# Patient Record
Sex: MALE | Race: WHITE | NOT HISPANIC OR LATINO | Employment: FULL TIME | URBAN - METROPOLITAN AREA
[De-identification: names, ages, dates, MRNs, and addresses within clinical notes are randomized per-mention and may not be internally consistent; named-entity substitution may affect disease eponyms.]

---

## 2017-06-22 ENCOUNTER — RECORDS - HEALTHEAST (OUTPATIENT)
Dept: LAB | Facility: CLINIC | Age: 52
End: 2017-06-22

## 2017-06-23 LAB
CHOLEST SERPL-MCNC: 220 MG/DL
FASTING STATUS PATIENT QL REPORTED: ABNORMAL
HDLC SERPL-MCNC: 55 MG/DL
LDLC SERPL CALC-MCNC: 145 MG/DL
PSA SERPL-MCNC: 0.6 NG/ML (ref 0–3.5)
TRIGL SERPL-MCNC: 99 MG/DL

## 2021-05-27 ENCOUNTER — RECORDS - HEALTHEAST (OUTPATIENT)
Dept: ADMINISTRATIVE | Facility: CLINIC | Age: 56
End: 2021-05-27

## 2021-05-28 ENCOUNTER — RECORDS - HEALTHEAST (OUTPATIENT)
Dept: ADMINISTRATIVE | Facility: CLINIC | Age: 56
End: 2021-05-28

## 2021-12-14 ENCOUNTER — HOSPITAL ENCOUNTER (OUTPATIENT)
Dept: CT IMAGING | Facility: CLINIC | Age: 56
Discharge: HOME | End: 2021-12-14
Attending: PHYSICIAN ASSISTANT | Admitting: PHYSICIAN ASSISTANT
Payer: COMMERCIAL

## 2021-12-14 DIAGNOSIS — Z47.1 AFTERCARE FOLLOWING RIGHT HIP JOINT REPLACEMENT SURGERY: ICD-10-CM

## 2021-12-14 DIAGNOSIS — Z96.641 AFTERCARE FOLLOWING RIGHT HIP JOINT REPLACEMENT SURGERY: ICD-10-CM

## 2021-12-14 DIAGNOSIS — M25.551 RIGHT HIP PAIN: ICD-10-CM

## 2021-12-14 PROCEDURE — 73701 CT LOWER EXTREMITY W/DYE: CPT | Mod: RT

## 2021-12-14 PROCEDURE — 250N000011 HC RX IP 250 OP 636: Performed by: PHYSICIAN ASSISTANT

## 2021-12-14 RX ORDER — IOPAMIDOL 755 MG/ML
100 INJECTION, SOLUTION INTRAVASCULAR ONCE
Status: COMPLETED | OUTPATIENT
Start: 2021-12-14 | End: 2021-12-14

## 2021-12-14 RX ADMIN — IOPAMIDOL 100 ML: 755 INJECTION, SOLUTION INTRAVENOUS at 19:13

## 2024-04-09 ENCOUNTER — HOSPITAL ENCOUNTER (EMERGENCY)
Facility: CLINIC | Age: 59
Discharge: HOME | End: 2024-04-10
Attending: EMERGENCY MEDICINE | Admitting: EMERGENCY MEDICINE

## 2024-04-09 DIAGNOSIS — R45.851 SUICIDAL IDEATION: ICD-10-CM

## 2024-04-09 DIAGNOSIS — E87.6 HYPOKALEMIA: ICD-10-CM

## 2024-04-09 DIAGNOSIS — F10.229 ACUTE ALCOHOLIC INTOXICATION IN ALCOHOLISM WITH COMPLICATION (H): ICD-10-CM

## 2024-04-09 DIAGNOSIS — E83.42 HYPOMAGNESEMIA: ICD-10-CM

## 2024-04-09 LAB
ALBUMIN SERPL BCG-MCNC: 3.8 G/DL (ref 3.5–5.2)
ALP SERPL-CCNC: 121 U/L (ref 40–150)
ALT SERPL W P-5'-P-CCNC: 21 U/L (ref 0–70)
ANION GAP SERPL CALCULATED.3IONS-SCNC: 13 MMOL/L (ref 7–15)
AST SERPL W P-5'-P-CCNC: 100 U/L (ref 0–45)
BILIRUB SERPL-MCNC: 0.8 MG/DL
BUN SERPL-MCNC: 4.5 MG/DL (ref 6–20)
CALCIUM SERPL-MCNC: 8.4 MG/DL (ref 8.6–10)
CHLORIDE SERPL-SCNC: 101 MMOL/L (ref 98–107)
CREAT SERPL-MCNC: 0.53 MG/DL (ref 0.67–1.17)
DEPRECATED HCO3 PLAS-SCNC: 27 MMOL/L (ref 22–29)
EGFRCR SERPLBLD CKD-EPI 2021: >90 ML/MIN/1.73M2
ERYTHROCYTE [DISTWIDTH] IN BLOOD BY AUTOMATED COUNT: 13.5 % (ref 10–15)
ETHANOL SERPL-MCNC: 0.35 G/DL
GLUCOSE SERPL-MCNC: 118 MG/DL (ref 70–99)
HCT VFR BLD AUTO: 43.8 % (ref 40–53)
HGB BLD-MCNC: 15.5 G/DL (ref 13.3–17.7)
INR PPP: 0.97 (ref 0.85–1.15)
LIPASE SERPL-CCNC: 57 U/L (ref 13–60)
MAGNESIUM SERPL-MCNC: 1.6 MG/DL (ref 1.7–2.3)
MCH RBC QN AUTO: 33.1 PG (ref 26.5–33)
MCHC RBC AUTO-ENTMCNC: 35.4 G/DL (ref 31.5–36.5)
MCV RBC AUTO: 94 FL (ref 78–100)
PLATELET # BLD AUTO: 65 10E3/UL (ref 150–450)
POTASSIUM SERPL-SCNC: 3.3 MMOL/L (ref 3.4–5.3)
PROT SERPL-MCNC: 6.8 G/DL (ref 6.4–8.3)
RBC # BLD AUTO: 4.68 10E6/UL (ref 4.4–5.9)
SODIUM SERPL-SCNC: 141 MMOL/L (ref 135–145)
WBC # BLD AUTO: 4.6 10E3/UL (ref 4–11)

## 2024-04-09 PROCEDURE — 85027 COMPLETE CBC AUTOMATED: CPT | Performed by: EMERGENCY MEDICINE

## 2024-04-09 PROCEDURE — 96365 THER/PROPH/DIAG IV INF INIT: CPT

## 2024-04-09 PROCEDURE — 83735 ASSAY OF MAGNESIUM: CPT | Performed by: EMERGENCY MEDICINE

## 2024-04-09 PROCEDURE — 85610 PROTHROMBIN TIME: CPT | Performed by: EMERGENCY MEDICINE

## 2024-04-09 PROCEDURE — 82077 ASSAY SPEC XCP UR&BREATH IA: CPT | Performed by: EMERGENCY MEDICINE

## 2024-04-09 PROCEDURE — 80053 COMPREHEN METABOLIC PANEL: CPT | Performed by: EMERGENCY MEDICINE

## 2024-04-09 PROCEDURE — 250N000011 HC RX IP 250 OP 636: Performed by: EMERGENCY MEDICINE

## 2024-04-09 PROCEDURE — 83690 ASSAY OF LIPASE: CPT | Performed by: EMERGENCY MEDICINE

## 2024-04-09 PROCEDURE — 258N000003 HC RX IP 258 OP 636: Performed by: EMERGENCY MEDICINE

## 2024-04-09 PROCEDURE — 99285 EMERGENCY DEPT VISIT HI MDM: CPT | Mod: 25

## 2024-04-09 PROCEDURE — 36415 COLL VENOUS BLD VENIPUNCTURE: CPT | Performed by: EMERGENCY MEDICINE

## 2024-04-09 PROCEDURE — 250N000013 HC RX MED GY IP 250 OP 250 PS 637: Performed by: EMERGENCY MEDICINE

## 2024-04-09 PROCEDURE — 96375 TX/PRO/DX INJ NEW DRUG ADDON: CPT

## 2024-04-09 RX ORDER — MAGNESIUM SULFATE HEPTAHYDRATE 40 MG/ML
2 INJECTION, SOLUTION INTRAVENOUS ONCE
Status: COMPLETED | OUTPATIENT
Start: 2024-04-09 | End: 2024-04-10

## 2024-04-09 RX ORDER — LORAZEPAM 2 MG/ML
1 INJECTION INTRAMUSCULAR ONCE
Status: COMPLETED | OUTPATIENT
Start: 2024-04-09 | End: 2024-04-09

## 2024-04-09 RX ORDER — POTASSIUM CHLORIDE 1500 MG/1
40 TABLET, EXTENDED RELEASE ORAL ONCE
Status: COMPLETED | OUTPATIENT
Start: 2024-04-09 | End: 2024-04-09

## 2024-04-09 RX ADMIN — LORAZEPAM 1 MG: 2 INJECTION INTRAMUSCULAR; INTRAVENOUS at 23:06

## 2024-04-09 RX ADMIN — POTASSIUM CHLORIDE 40 MEQ: 1500 TABLET, EXTENDED RELEASE ORAL at 23:34

## 2024-04-09 RX ADMIN — FAMOTIDINE 20 MG: 10 INJECTION, SOLUTION INTRAVENOUS at 23:06

## 2024-04-09 RX ADMIN — SODIUM CHLORIDE 1000 ML: 9 INJECTION, SOLUTION INTRAVENOUS at 23:05

## 2024-04-09 RX ADMIN — MAGNESIUM SULFATE HEPTAHYDRATE 2 G: 40 INJECTION, SOLUTION INTRAVENOUS at 23:34

## 2024-04-09 ASSESSMENT — LIFESTYLE VARIABLES
PAROXYSMAL SWEATS: NO SWEAT VISIBLE
ANXIETY: NO ANXIETY, AT EASE
AGITATION: NORMAL ACTIVITY
VISUAL DISTURBANCES: NOT PRESENT
TOTAL SCORE: 7
TREMOR: 6
AUDITORY DISTURBANCES: NOT PRESENT
HEADACHE, FULLNESS IN HEAD: NOT PRESENT
NAUSEA AND VOMITING: NO NAUSEA AND NO VOMITING
ORIENTATION AND CLOUDING OF SENSORIUM: CANNOT DO SERIAL ADDITIONS OR IS UNCERTAIN ABOUT DATE

## 2024-04-09 ASSESSMENT — COLUMBIA-SUICIDE SEVERITY RATING SCALE - C-SSRS
6. HAVE YOU EVER DONE ANYTHING, STARTED TO DO ANYTHING, OR PREPARED TO DO ANYTHING TO END YOUR LIFE?: YES
2. HAVE YOU ACTUALLY HAD ANY THOUGHTS OF KILLING YOURSELF IN THE PAST MONTH?: YES
4. HAVE YOU HAD THESE THOUGHTS AND HAD SOME INTENTION OF ACTING ON THEM?: NO
3. HAVE YOU BEEN THINKING ABOUT HOW YOU MIGHT KILL YOURSELF?: YES
5. HAVE YOU STARTED TO WORK OUT OR WORKED OUT THE DETAILS OF HOW TO KILL YOURSELF? DO YOU INTEND TO CARRY OUT THIS PLAN?: YES
1. IN THE PAST MONTH, HAVE YOU WISHED YOU WERE DEAD OR WISHED YOU COULD GO TO SLEEP AND NOT WAKE UP?: YES

## 2024-04-09 ASSESSMENT — ACTIVITIES OF DAILY LIVING (ADL): ADLS_ACUITY_SCORE: 35

## 2024-04-10 VITALS
BODY MASS INDEX: 28.14 KG/M2 | HEIGHT: 69 IN | SYSTOLIC BLOOD PRESSURE: 158 MMHG | WEIGHT: 190 LBS | DIASTOLIC BLOOD PRESSURE: 104 MMHG | OXYGEN SATURATION: 96 % | HEART RATE: 104 BPM | TEMPERATURE: 98 F | RESPIRATION RATE: 15 BRPM

## 2024-04-10 PROBLEM — F10.229: Status: ACTIVE | Noted: 2024-04-10

## 2024-04-10 PROBLEM — F41.1 GENERALIZED ANXIETY DISORDER: Status: ACTIVE | Noted: 2024-04-10

## 2024-04-10 PROBLEM — F33.1 MAJOR DEPRESSIVE DISORDER, RECURRENT EPISODE, MODERATE (H): Status: ACTIVE | Noted: 2024-04-10

## 2024-04-10 PROCEDURE — 96375 TX/PRO/DX INJ NEW DRUG ADDON: CPT

## 2024-04-10 PROCEDURE — 250N000011 HC RX IP 250 OP 636: Performed by: FAMILY MEDICINE

## 2024-04-10 PROCEDURE — 96376 TX/PRO/DX INJ SAME DRUG ADON: CPT

## 2024-04-10 RX ORDER — DIAZEPAM 5 MG
5 TABLET ORAL ONCE
Status: COMPLETED | OUTPATIENT
Start: 2024-04-10 | End: 2024-04-10

## 2024-04-10 RX ORDER — DIAZEPAM 10 MG/2ML
2.5 INJECTION, SOLUTION INTRAMUSCULAR; INTRAVENOUS ONCE
Status: COMPLETED | OUTPATIENT
Start: 2024-04-10 | End: 2024-04-10

## 2024-04-10 RX ORDER — ONDANSETRON 2 MG/ML
4 INJECTION INTRAMUSCULAR; INTRAVENOUS ONCE
Status: COMPLETED | OUTPATIENT
Start: 2024-04-10 | End: 2024-04-10

## 2024-04-10 RX ADMIN — DIAZEPAM 2.5 MG: 5 INJECTION, SOLUTION INTRAMUSCULAR; INTRAVENOUS at 10:44

## 2024-04-10 RX ADMIN — ONDANSETRON 4 MG: 2 INJECTION INTRAMUSCULAR; INTRAVENOUS at 10:44

## 2024-04-10 RX ADMIN — DIAZEPAM 2.5 MG: 5 INJECTION, SOLUTION INTRAMUSCULAR; INTRAVENOUS at 11:55

## 2024-04-10 ASSESSMENT — LIFESTYLE VARIABLES
NAUSEA AND VOMITING: MILD NAUSEA WITH NO VOMITING
ANXIETY: MILDLY ANXIOUS
HEADACHE, FULLNESS IN HEAD: NOT PRESENT
AUDITORY DISTURBANCES: NOT PRESENT
NAUSEA AND VOMITING: MILD NAUSEA WITH NO VOMITING
NAUSEA AND VOMITING: MILD NAUSEA WITH NO VOMITING
TREMOR: 6
PAROXYSMAL SWEATS: BEADS OF SWEAT OBVIOUS ON FOREHEAD
VISUAL DISTURBANCES: NOT PRESENT
TREMOR: 2
AUDITORY DISTURBANCES: NOT PRESENT
AUDITORY DISTURBANCES: NOT PRESENT
HEADACHE, FULLNESS IN HEAD: NOT PRESENT
TREMOR: 3
AGITATION: NORMAL ACTIVITY
TREMOR: 3
ANXIETY: NO ANXIETY, AT EASE
AUDITORY DISTURBANCES: NOT PRESENT
TOTAL SCORE: 9
ANXIETY: 2
TOTAL SCORE: 7
VISUAL DISTURBANCES: NOT PRESENT
TOTAL SCORE: 7
AGITATION: NORMAL ACTIVITY
TOTAL SCORE: 10
ORIENTATION AND CLOUDING OF SENSORIUM: CANNOT DO SERIAL ADDITIONS OR IS UNCERTAIN ABOUT DATE
ORIENTATION AND CLOUDING OF SENSORIUM: ORIENTED AND CAN DO SERIAL ADDITIONS
PAROXYSMAL SWEATS: NO SWEAT VISIBLE
PAROXYSMAL SWEATS: BEADS OF SWEAT OBVIOUS ON FOREHEAD
PAROXYSMAL SWEATS: 3
HEADACHE, FULLNESS IN HEAD: NOT PRESENT
VISUAL DISTURBANCES: NOT PRESENT
VISUAL DISTURBANCES: NOT PRESENT
ORIENTATION AND CLOUDING OF SENSORIUM: ORIENTED AND CAN DO SERIAL ADDITIONS
ANXIETY: MILDLY ANXIOUS
AGITATION: NORMAL ACTIVITY
NAUSEA AND VOMITING: NO NAUSEA AND NO VOMITING
ORIENTATION AND CLOUDING OF SENSORIUM: ORIENTED AND CAN DO SERIAL ADDITIONS
HEADACHE, FULLNESS IN HEAD: NOT PRESENT
AGITATION: NORMAL ACTIVITY

## 2024-04-10 ASSESSMENT — ACTIVITIES OF DAILY LIVING (ADL)
ADLS_ACUITY_SCORE: 36
ADLS_ACUITY_SCORE: 36
ADLS_ACUITY_SCORE: 35
ADLS_ACUITY_SCORE: 36
ADLS_ACUITY_SCORE: 35
ADLS_ACUITY_SCORE: 36
ADLS_ACUITY_SCORE: 35
ADLS_ACUITY_SCORE: 35

## 2024-04-10 NOTE — ED NOTES
ED Behavioral Health Patient Transition of Care Note    Assuming care from:  Pb Hollingsworth MD    Brief history:  Pb Horne is a 59 year old male with a pertinent medical history of alcohol withdrawal who presents to the ED for evaluation of alcohol problem.     The patient reports he had a seizure tonight and hit his head. He endorses suicidal ideation. He reports having a history of seizures for the past 2 years. He was hospitalized recently for alcohol abuse. He reports typically drinking around 10 shots of liquor per day. His last drink occurred tonight. The patient was in detox around May, 2023.     7:00 AM patient signed out to Dr. Pathak pending DEC assessment    Any outstanding medical concerns:  None    Has SW seen the patient:  no    Is the patient on a hold:   voluntary    Current plan for disposition:  Awaiting SW evaluation    Safety concerns:  No, patient is cooperative    Dietary restrictions:  None, pt can eat and drink ad aaliyah    Have daily medications been ordered:  no daily meds needed    Significant events during shift:    No significant vents during course of observation.  Patient plan to speak with DEC  due to suicidal thoughts.  Patient however still intoxicated when DEC tried to speak with him and patient will be plan for continued sobering until DEC will reassess at 10 AM.       1. Acute alcoholic intoxication in alcoholism with complication (H)    2. Suicidal ideation    3. Hypokalemia    4. Hypomagnesemia               Marty Aguirre MD  04/10/24 8441

## 2024-04-10 NOTE — ED NOTES
"Intermittent confusion noted when he wakes up from sleep- easily reoriented. Thinks he's at [facility] and kept asking for \"Samantha.\" Per pt \"samantha\" is his sister who works at [employer]. Denies any pain or needs at this time. DEC in the morning.   "

## 2024-04-10 NOTE — ED NOTES
[facility] consulted with Supervisor JUDY and Risk Management KAITLYNN in regards to ERPO. Recommendation to place call with [facility] Office was determined. [facility] called at 12:10PM on 4.10.24 to [facility] at [phone]. [facility] spoke with a dispatcher and requested officer call back.     [facility] spoke with officer  Artur Vinson (Badge number [phone]) at 1:20PM on 4.10.24. Case Number is [id number]      Details of what was warned:  [facility] informed  (PO) that pt has on going alcohol use and suicidal ideations. [facility] informed PO that pt has historically had ideations around thoughts to harm self with firearms, and currently owns a firearm in the home. [facility] educated PO that pt's friend, young, reported willingness to secure firearm out of the home today and hold this over the next few months and pt was open to this as well. PO is aware there are concerns that pt could access firearms through buying in community again, which places him at risk due to historical and chronic MI/CD concerns.  PO aware pt is unaware of report being made.

## 2024-04-10 NOTE — ED NOTES
"Pt endorsed suicidal thoughts. He said he has a \"12 gauge\" at home. Pt clarified it's a shotgun and that he wants to \"just sleep and never wake up.\" MD notified. 1:1 attendant in place. Safety check done with security. Room made safe.   "

## 2024-04-10 NOTE — DISCHARGE INSTRUCTIONS
Recommendations:  -Follow up with PCP  -Consider starting individual therapy  -Consider medication consultation with psychiatrist  -Obtain a chemical dependency evaluation and follow recommendations for level of care  -Seek out chemical dependency Detox  ______________________________  Safety Recommendations:  -Come back to the Emergency Department with any new or worsening symptoms     -Use community resources, including [employer] numbers, [facility] crisis and support meetings     -Maintain a daily schedule/routine     -Practice deep breathing skills     -Disclose my urges to people I trust, such as:  Ramírez Brasher, Bharath    -Abstain from all mood altering chemicals not currently prescribed to me      -Reduce caffeine intake (this can exacerbate anxiety and negatively impact sleep)    -Take medications as prescribed. Remove access to large amounts medications, have a pill lema for any prescribed medications.If appropriate, take medications with the help of loved ones to support daily compliance.       -Talk with family/ loved ones about your prodromal symptoms      -Reflect on symptoms before and after episodes with symptoms to gain insight into triggers and the need for additional care.      -Safety plan in the home, increase observation and check in often with family. Keep door open and be open to touching base with family as often as possible. Consider 24/7 support over the next several days to ensure safety and support.     -Remove any unsafe objects in the home like firearms or sharp objects (knives, razors). Discuss with family on going removal as needed to ensure safety.     -Follow up with out-patient recommended levels of care that were discussed today    -Remember, start where you are, use what you have and do what you can in regard to coping skills and services.    __________________________  Coping Skills:       -I will commit to 30 minutes of self care daily - this can be as simple as taking a shower, going for  a walk, cooking a meal, reading, writing, watching something funny, cleaning your home, or evening looking up affirmations.     -I will practice square breathing when I begin to feel anxious - in breath through the nose for the count of 4 and the first line on the square. Out breath through the mouth for the count of 4 for the second line of the square. Repeat to complete the square. Repeat the square as many times as needed.     - I will use distraction skills of: going for walks, watching TV, spending time outside, calling a friend or family member, creating a playlist, write a hand written letter to a loved one, or listening to a pod cast.      -Download a meditation melquiades and spend 15-20 minutes per day mediating/relaxing. Some apps to download include: Calm, Headspace and Insight Timer. All 3 of these apps have free version     Grounding Techniques:      Try to notice where you are, your surroundings including the people, the sounds like the TV or radio.      Concentrate on your breathing. Take a deep cleansing breath from your diaphragm. Count the breaths as you exhale. Make sure you breath slowly.      Hold something that you find comforting, for some it may be a stuffed animal or a blanket. Notice how it feels in your hands. Is it hard or soft?      During a non-crisis time make a list of positive affirmations. Print them out and keep them handy for times of intense anxiety. At those times, read them aloud.     Try the Meican game:      Name 5 things you can see in the room with you      Name 4 things you can feel ( clothing on your back  or   fan on your skin )       Name 3 things you can hear right now ( people talking ,  birds  or  tv )       Name 2 things you can smell right now (or, 2 things you like the smell of)       Name 1 good thing about yourself     Create A Safe Place      Image a safe place -- it can be a real or imaginary place:       What do you see -- especially colors?       What sounds do you  hear?       What sensations do you feel?       What smells do you smell?       What people or animals would you want in your safe place?       Imagine a protective bubble, wall or boundary around your safe place.       Imagine a door or gate with a guard at your safe place.       Image a lock and key to your safe place and only you can unlock it.      You can draw or make a collage that represents your safe place.       Choose a souvenir of your safe place -- a color, an object, a song.       Keep your image of your safe place so you can come back to it when you need to.      Reduce Extreme Emotion  QUICKLY:  Changing Your Body Chemistry       Change your body Temperature to change your autonomic nervous system       Use Ice Water to calm yourself down FAST       Splash ice water on your face, or hold an ice pack on your face      Intensely exercise to calm down a body revved up by emotion       Examples: running, walking fast, jumping, playing basketball, weight lifting, swimming, calisthenics, etc.       Engage in exercises that DO NOT include violent behaviors. Exercises that utilize violent behaviors tend to function as  behavioral rehearsal,  and rather than calming the person down, may actually  rev  the person up more, increasing the likelihood of violence, and lessening the likelihood that they will  burn off  energy      Progressively relax your muscles       Starting with your hands, moving to your forearms, upper arms, shoulders, neck, forehead, eyes, cheeks and lips, tongue and teeth, chest, upper back, stomach, buttocks, thighs, calves, ankles, feet       Tense (10 seconds,   of the way), then relax each muscle (all the way)       Notice the tension       Notice the difference when relaxed (by tensing first, and then relaxing, you are able to get a more thorough relaxation than by simply relaxing)       Paced breathing to relax       The standard technique is to begin with counting the number of steps  "one takes for a typical inhale, then counting the steps one takes for a typical exhale, and then lengthening the amount of steps for the exhalation by one or two steps.  OR      Repeat this pattern for 1-2 minutes      Inhale for four (4) seconds       Exhale for six (6) to eight (8) seconds       Research demonstrated that one can change one's overall level of anxiety by doing this exercise for even a few minutes per day     Practice Urge Surfing      This is a mindfulness technique that can be used to help reduce impulsive behaviors. Take several big deep breaths and \"ride the wave\" before you act upon negative thoughts or strong reactions.      1. Identify the Physical Sensation in the Body. Stop for a few minutes and be mindful of your physical responses to your urge. You can close your eyes ...  2. Focus on the Sensations. 3. Notice Breathing. 4. Refocus on Your Body. 5 Stay Curious and Present.       Sensations      Squeeze a rubber ball very hard. Listen to very loud music. Hold ice in your hand or mouth. Pay with sensory items. Go out in the rain or snow. Take a hot or cold shower.  Place an ice pack or a warm cloth on your forehead. Remember that you can use your 5 senses as helpful self-soothing techniques.     DBT Skills:    The ABC PLEASE skill is about taking good care of ourselves so that we can take care of others. Also, an important component of DBT is to reduce our vulnerability. When we take good care of ourselves, we are less likely to be vulnerable to disease and emotional crisis.        ABC      A- Accumulate positive emotions by doing things that are pleasant.      B- Build mastery by doing things we enjoy. Whether it is reading, cooking, cleaning, fixing a car, working a cross word puzzle, or playing a musical instrument. Practice these things to  and in time we feel competent.      C- [place] Ahead by rehearsing a plan ahead of time so that we can be prepared to cope skillfully. " (Think of what makes situations difficult, and what helps in those situations)         PLEASE      Treat Physical Illness and take medications as prescribed.      Balance eating in order to avoid mood swings.      Avoid mood-Altering substances and have mood control.      Maintain good sleep so you can enjoy your life.      Get exercise to maintain high spirits.    ____________________________  Out-Patient Clinics:  [facility]   [phone]   *Offers Individual Therapy, Medication Management, In-Home Therapy and Mental Health Case Workers; Can self refer online or by phone.     [facility]   [phone]   *Offers Individual Therapy, Chemical Dependency Evaluations, Group Services, Psychological Testing, In-Home Therapy, Nutrition Counseling, Mother-Baby Programming, Medication Management and Mental Health Case Workers; Can self refer online or by phone.     [facility]   [phone]   *Offers medication management, therapy, DBT Group, In-Home and geriatric care . Can self refer online or by phone.      [facility]  [phone]  *Offers Individual Therapy, Group Services, Psychological Testing, In-Home Therapy, Medication Management, and Intensive Treatment in Foster Care (ITFC) Services; Can self refer online or by phone.      You can also go to CH[employer] Mobile App and utilize the filters for insurance, location, gender preference and preferred specialities to read and find providers near you.     Walk in Counseling Caret Phone (free remote counseling): [phone] Web address: [email or web address]      _________________________  Substance Use Disorder Direct Access Resources    It is recommended that you abstain from all mood altering chemicals. Please contact the sober support hotline ([phone]) as needed; phones are answered 24 hours a day, 7 days a week.    To access substance use treatment you must have a comprehensive assessment completed to begin any  treatment program.     If uninsured, please contact your county of residence for eligibility screen to substance use disorder evaluation and treatment:    [place] - [phone]   [place] - [phone]   [place] - [phone]   [place] - [phone]   [place] - [phone]   [place] - [phone]   [place] - [phone]   [place] - [phone]     If you have private insurance, call the customer service number on the back of your insurance card to find an in-network substance abuse use disorder assessment. The ideal provider will be a treatment facility, licensed in the [facility].     Community ROXY Evaluations: Clients may call their county for a full list of providers - Availability and services listed belo are subject to change, please call the provider to confirm    [facility]  [phone]  [address] [place] [address], [address]  *Please call the above number to schedule a comprehensive assessment for determination of level of care needs. In person and virtual appointments available Mon-Fri.    [facility], [address] [place] [address], First Floor, [address], [place] [address] (next to the outpatient lab)    Phone: [phone]   Provides bridging services to people with Opiate Use Disorders (OUD) seeking care. This is a front door to Medication Assisted Treatments (MAT), ages 16+  Walk In hours: Monday-Friday 9:00am-3:00pm    [facility]  [phone]  Walk in Assessments: Mon-Friday 7a-1:45p  [address], [place], [address] [place] [address] Recovery - [facility]  Central Access [phone]  [address] [place] [address], [address]  *by appointment only    [facility]  [phone] (phone consultation available )  Locations in: [place], [place], [facility], and [place], [place]  Lithuanian virtual IOP [phone] or visit [email or web address]   Also offers LGBTQ programming     [facility]  [place]  [phone]  [address]  [place], [place], [address]  *Currently only offered via telehealth - call to set up an appointment    [facility]  [address] [place], [place], [address]  [facility]  For more information to to make a referral call:  [phone]  Walk-in on Fridays  9-11 a.m.    [facility]  [phone]  [address] [place], [place], [address]  *available by appointments only    [facility]  [phone]  [address] [place], [place], [address]  *available by appointment only    [facility]  [phone]  [address] [place], [place], [address]  *walk in assessments available M-F starting at 7 am.    [facility]  [phone]  Locations: [facility], [facility], and [place]  *Walk in assessments availble M-F starting at 8 am -virtual only    [facility]  [phone]  [address] [place] [address]    [facility]  Virtual + Locations: [place], [place], [place], [place], [facility], [facility] [place], [place]   [phone]  *available by appointment only    [facility]  [phone]  [address]  [place], [place], [address]    [facility]  [phone]  [address] [place], [address]  *available by appointment    [facility]  [phone]  [address] [place], [place], [address]  *walk ins available M-TH from 9-3    [facility]  MAT program: [phone]  [address] [place], [place], [address]    [place]  [phone]  Same day substance use disorder assessments are available Monday - Friday, via walk-in or by appointment at the [place] location.  [address], [address], [place] [address]     [facility] - adolescent and adult SUDs services  [phone]  Offer services Monday through Friday, as well as evening hours Monday through  Thursday. Normally, a first appointment will be scheduled within one week  [email or web address]  Locations all over [place]    If you are intoxicated, you may be required to detox at a detox facility before starting treatment. The following are detox facilities that you can self present to. All detox facilities are able to help you complete an assessment prior to discharge if you choose:    [facility]: Arrive at a [facility] for immediate medical evaluation    [facility]: [address] [place], [place], [address].         [phone]    [facility]: [address] [place], [place], [address]  [phone]     [facility]): [address] [place], [place], [address] [phone]     [facility]: [address] [place], [place], [address], [phone]         Ways to help cope with sobriety:    -- Take prescribed medicines as scheduled  -- Keep follow-up appointments  -- Talk to others about your concerns  -- Get regular exercise  -- Practice deep breathing skills  -- Eat a healthy diet  -- Use community resources, including hotline numbers, [facility] crisis and support meetings  -- Stay sober and avoid places/people/things associated with substance use  --Maintain a daily schedule/routine  --Get at least 7-8 hours of sleep per night  --Create a list 10--20 healthy activities that you can do that are enjoyable and do not involve substance use  --Create daily goals (approx. 1-4 goals) per day and work to achieve them throughout the day.       Free Resources:    [place] [facility] ([facility] [facility])  [facility] connects people seeking recovery to resources that help foster and sustain long-term recovery. Whether you are seeking resources for treatment, transportation, housing, job training, education, health care or other pathways to recovery, [place] [facility] is a great place to start.  Phone: [phone].  www.[facility].org (Great listing of all types of recovery and non-recovery related resources)    Alcoholics Anonymous  Phone: 6-025-ALCOHOL  Website: HTTP://WWW.AA.ORG/  [facility] [place] (664-165-7255 or http://a[place].org)  [facility] [place] (370-251-1277 or www.aastpaul.org)     Narcotics Anonymous  Phone: 182.340.8202  Website: www.Mail.Ru Group.Carnet de Mode.    [facility] [address], [place], [place],  Phone: [phone]  Drop-in Hours: Monday-Friday 9-11:30 am. By appointment at other times.  Provides: [facility] is a drop-in center on the east side [facility] that provides a safe space for individuals who are homeless and have a history of chemical use. Sobriety is not a requirement but drugs and alcohol are not allowed on the property.  Services: Non-clients can access drop-in services such as Recovery and Harm Reduction Groups, referrals to case management, community activities, shower facilities, and a pool table. Individuals who are homeless and have chemical health needs may be eligible for enrollment into [facility]'s case management program. Clients and  work together to access benefits, treatment, health care, shelter, and external housing resources.
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
## 2024-04-10 NOTE — ED TRIAGE NOTES
Patient to ED via EMS following a call from a 3rd party with concerns for seizures. Pt is a heavy drinker and per pt he's been cutting down his alcohol consumption to about 10-12 shots of heavy liquor a day. Multiple falls in the past weeks, none today. Has not followed-up in with a neurologist/ PCP.

## 2024-04-10 NOTE — ED NOTES
Sleeping soundly. 1:1 reports O2sats dropped to 90 and is currently on 2 liters nc.  Will increase to 3 liters for now.

## 2024-04-10 NOTE — ED NOTES
EMERGENCY DEPARTMENT SIGN OUT NOTE        ED COURSE AND MEDICAL DECISION MAKING  7:26 AM Patient was signed out to me by Dr Tommy Aguirre.  In brief, Pb Horne is a 59 year old male who initially presented with alcohol dependence and suicide ideation.  Waiting for DEC assessment at 10 AM.  10:35 AM patient with some increasing tremulousness and now with vomiting.  Zofran and Valium IV are ordered.  DEC assessment in process  11:32 AM  Care discussed with DEC , stable for discharge with outpatient resources, declines discharge.    FINAL IMPRESSION    1. Acute alcoholic intoxication in alcoholism with complication (H)    2. Suicidal ideation    3. Hypokalemia    4. Hypomagnesemia        ED MEDS  Medications   sodium chloride 0.9% BOLUS 1,000 mL (0 mLs Intravenous Stopped 4/10/24 0015)   LORazepam (ATIVAN) injection 1 mg (1 mg Intravenous $Given 4/9/24 2306)   famotidine (PEPCID) injection 20 mg (20 mg Intravenous $Given 4/9/24 2306)   magnesium sulfate 2 g in 50 mL sterile water intermittent infusion (0 g Intravenous Stopped 4/10/24 0035)   potassium chloride velia ER (KLOR-CON M20) CR tablet 40 mEq (40 mEq Oral $Given 4/9/24 2334)   diazepam (VALIUM) tablet 5 mg (5 mg Oral Not Given 4/10/24 1050)   ondansetron (ZOFRAN) injection 4 mg (4 mg Intravenous $Given 4/10/24 1044)   diazepam (VALIUM) injection 2.5 mg (2.5 mg Intravenous $Given 4/10/24 1044)       LAB  Labs Ordered and Resulted from Time of ED Arrival to Time of ED Departure   CBC WITH PLATELETS - Abnormal       Result Value    WBC Count 4.6      RBC Count 4.68      Hemoglobin 15.5      Hematocrit 43.8      MCV 94      MCH 33.1 (*)     MCHC 35.4      RDW 13.5      Platelet Count 65 (*)    COMPREHENSIVE METABOLIC PANEL - Abnormal    Sodium 141      Potassium 3.3 (*)     Carbon Dioxide (CO2) 27      Anion Gap 13      Urea Nitrogen 4.5 (*)     Creatinine 0.53 (*)     GFR Estimate >90      Calcium 8.4 (*)     Chloride 101      Glucose 118 (*)     Alkaline  Phosphatase 121       (*)     ALT 21      Protein Total 6.8      Albumin 3.8      Bilirubin Total 0.8     MAGNESIUM - Abnormal    Magnesium 1.6 (*)    ETHYL ALCOHOL LEVEL - Abnormal    Alcohol ethyl 0.35 (*)    INR - Normal    INR 0.97     LIPASE - Normal    Lipase 57         RADIOLOGY    No orders to display       DISCHARGE MEDS  New Prescriptions    No medications on file       Say Pathak MD  [facility] EMERGENCY ROOM  [address] [place] [id number] [phone]       Say Pathak MD  04/11/24 3164

## 2024-04-10 NOTE — ED PROVIDER NOTES
"EMERGENCY DEPARTMENT ENCOUNTER      NAME: Pb Horne  AGE: 59 year old male  YOB: 1965  MRN: [id number]  EVALUATION DATE & TIME: 4/9/2024 10:14 PM    PCP: Asmita Wayne    ED PROVIDER: Pb Hollingsworht M.D.      Chief Complaint   Patient presents with    Alcohol Problem         FINAL IMPRESSION:  Acute alcohol intoxication with chronic alcoholism  Suicidal ideation  Hypokalemia  Hypomagnesemia    ED COURSE & MEDICAL DECISION MAKING:    Pertinent Labs & Imaging studies reviewed. (See chart for details)  59 year old male presents to the Emergency Department for evaluation of \"maybe had a seizure\".  Patient admits to drinking heavily daily.  Tonight he feels he might of had a seizure because he states he fell and hit his head.  Does not provide a lot of other information.  Examination of the scalp does not reveal any signs of injury.  Patient is alert and conversant.  Slurring his words consistent with intoxication.  Review of records indicate patient presented similarly almost a month ago at another facility.  Patient at that time passively suicidal and required clearance by DEC.  On initial exam patient without obvious reports of suicidality. Patient appears non toxic with stable vitals signs.  Will obtain baseline blood bladder over concerns of electrolyte imbalance, acidosis.  Blood alcohol level also be obtained to assess for timing for sobriety.  Patient treated symptomatically with intravenous fluid bolus, Pepcid and Ativan    10:20 PM I met with the patient for the initial interview and physical examination. Discussed plan for treatment and workup in the ED.    10:40 PM.  Nurse reports patient making references to buying a shotgun and being potentially suicidal.  This is consistent with patient's most recent hospitalization/visit.  Patient interviewed once again.  He does make references to just buying a shotgun but does not make overt suicidal reports   11 PM.  Blood alcohol markedly elevated " at greater than 300  11:14 PM.  Wheezing reduced at 1.6.  Supplemental magnesium given.  Lipase normal.  Potassium slightly reduced at 3.3.  Liver function test essentially normal.  CBC unremarkable.  Supplemental potassium was given also   4:31 AM.  Patient been resting quietly.  Will be evaluated by DEC this morning.  Patient discussed with my associate at end of shift  at the conclusion of the encounter I discussed the results of all of the tests and the disposition. The questions were answered and return precautions provided. The patient or family acknowledged understanding and was agreeable with the care plan.       PPE: Provider wore paper mask.       MEDICATIONS GIVEN IN THE EMERGENCY:  Medications - No data to display    NEW PRESCRIPTIONS STARTED AT TODAY'S ER VISIT  New Prescriptions    No medications on file          =================================================================    HPI    Patient information was obtained from: The patient    Use of Intrepreter: N/A       Pb Horne is a 59 year old male with a pertinent medical history of alcohol withdrawal who presents to the ED for evaluation of alcohol problem.    The patient reports he had a seizure tonight and hit his head. He endorses suicidal ideation. He reports having a history of seizures for the past 2 years. He was hospitalized recently for alcohol abuse. He reports typically drinking around 10 shots of liquor per day. His last drink occurred tonight. The patient was in detox around May, 2023.     Otherwise, the patient denied any other medical complaints or concerns at this time.    Per chart review,     The patient has an extensive history of being seen in the ER for alcohol abuse. He was last seen at [facility] ER on 03/03/2024. He presented with suicidal ideation and endorses intoxication with alcohol. Patient was not interested in MH and CD treatment and was discharged home.    REVIEW OF SYSTEMS   Constitutional:  Denies  "fever, chills  Respiratory:  Denies productive cough or increased work of breathing  Cardiovascular:  Denies chest pain, palpitations  GI:  Denies abdominal pain, nausea, vomiting, or change in bowel or bladder habits   Musculoskeletal:  Denies any new muscle/joint swelling  Skin:  Denies rash   Neurologic:  Denies focal weakness. Positive for seizures.  All systems negative except as marked.     PAST MEDICAL HISTORY:  No past medical history on file.    PAST SURGICAL HISTORY:  No past surgical history on file.      CURRENT MEDICATIONS:    No current facility-administered medications for this encounter.  No current outpatient medications on file.    ALLERGIES:  No Known Allergies    FAMILY HISTORY:  No family history on file.    SOCIAL HISTORY:   Social History     Socioeconomic History    Marital status: Single       VITALS:  BP (!) 130/95   Pulse 80   Temp 98  F (36.7  C) (Oral)   Resp 20   Ht 1.753 m (5' 9\")   Wt 86.2 kg (190 lb)   SpO2 92%   BMI 28.06 kg/m        PHYSICAL EXAM    Constitutional:  Awake, alert, in mild to moderate apparent distress  HENT:  Normocephalic, Atraumatic. Bilateral external ears normal. Oropharynx moist. Nose normal. Neck- Normal range of motion with no guarding, No midline cervical tenderness, Supple, No stridor.   Eyes:  PERRL, minimal nystagums, Conjunctiva normal, No discharge,   Respiratory:  Normal breath sounds, No respiratory distress, No wheezing.    Cardiovascular:  tachycardic, Normal rhythm, No appreciable rubs or gallops.   GI:  Soft, No tenderness, No distension, No palpable masses  Musculoskeletal:  Intact distal pulses, No edema. Good range of motion in all major joints. No tenderness to palpation or major deformities noted.  Integument:  Warm, Dry, No erythema, No rash.   Neurologic:  Alert & conversive, Normal motor function, Normal sensory function, No focal deficits noted, mild tremor, slurred speech,  Psychiatric:  Affect normal, Judgment normal, Mood " normal.  Passive suicidal ideation    LAB:  All pertinent labs reviewed and interpreted.     Results for orders placed or performed during the hospital encounter of 04/09/24   INR     Status: Normal   Result Value Ref Range    INR 0.97 0.85 - 1.15   CBC (+ platelets, no diff)     Status: Abnormal   Result Value Ref Range    WBC Count 4.6 4.0 - 11.0 10e3/uL    RBC Count 4.68 4.40 - 5.90 10e6/uL    Hemoglobin 15.5 13.3 - 17.7 g/dL    Hematocrit 43.8 40.0 - 53.0 %    MCV 94 78 - 100 fL    MCH 33.1 (H) 26.5 - 33.0 pg    MCHC 35.4 31.5 - 36.5 g/dL    RDW 13.5 10.0 - 15.0 %    Platelet Count 65 (L) 150 - 450 10e3/uL   Comprehensive metabolic panel     Status: Abnormal   Result Value Ref Range    Sodium 141 135 - 145 mmol/L    Potassium 3.3 (L) 3.4 - 5.3 mmol/L    Carbon Dioxide (CO2) 27 22 - 29 mmol/L    Anion Gap 13 7 - 15 mmol/L    Urea Nitrogen 4.5 (L) 6.0 - 20.0 mg/dL    Creatinine 0.53 (L) 0.67 - 1.17 mg/dL    GFR Estimate >90 >60 mL/min/1.73m2    Calcium 8.4 (L) 8.6 - 10.0 mg/dL    Chloride 101 98 - 107 mmol/L    Glucose 118 (H) 70 - 99 mg/dL    Alkaline Phosphatase 121 40 - 150 U/L     (H) 0 - 45 U/L    ALT 21 0 - 70 U/L    Protein Total 6.8 6.4 - 8.3 g/dL    Albumin 3.8 3.5 - 5.2 g/dL    Bilirubin Total 0.8 <=1.2 mg/dL   Lipase     Status: Normal   Result Value Ref Range    Lipase 57 13 - 60 U/L   Magnesium     Status: Abnormal   Result Value Ref Range    Magnesium 1.6 (L) 1.7 - 2.3 mg/dL   Ethyl Alcohol Level     Status: Abnormal   Result Value Ref Range    Alcohol ethyl 0.35 (HH) <=0.01 g/dL             Brian SPEAR, am serving as a scribe to document services personally performed by Pb Hollingsworth MD, based on my observation and the provider's statements to me. I, Pb Hollingsworth MD attest that Brian Zaragoza is acting in a scribe capacity, has observed my performance of the services and has documented them in accordance with my direction.    Pb Hollingsworth M.D.  Emergency Medicine  [facility]  [facility] EMERGENCY ROOM     Pb Hollingsworth MD  04/10/24 [id number]

## 2024-04-10 NOTE — ED NOTES
Patient was vitally stable throughout shift. Intermittent confusion noted, but was easily reoriented. Cooperative with nursing assessments. K and Mag were both replaced. Plan to consult with DEC this am, ETA between 1000- 1200. Diet order in place as well.  at bedside.     Micheal Elise RN on 4/10/2024 at 6:22 AM